# Patient Record
Sex: FEMALE | Race: OTHER | Employment: OTHER | URBAN - METROPOLITAN AREA
[De-identification: names, ages, dates, MRNs, and addresses within clinical notes are randomized per-mention and may not be internally consistent; named-entity substitution may affect disease eponyms.]

---

## 2017-06-13 NOTE — PATIENT DISCUSSION
AMD (DRY), Ou__:  PRESCRIBE AREDS 2 VITAMINS / AMSLER GRID QD/ UV PROTECTION. SMOKING CESSATION EMPHASIZED. RETURN FOR FOLLOW-UP AS SCHEDULED.

## 2018-06-13 NOTE — PATIENT DISCUSSION
Macular Drusen Counseling:   I have explained the diagnosis of macular drusen. The patient was advised of the importance of annual dilated eye exams. Return for follow-up as scheduled.

## 2021-02-11 NOTE — PATIENT DISCUSSION
AMD (Dry) Counseling:  I have instructed the patient to take an AREDS 2 vitamin mixture to minimize the risk of disease progression. The importance of daily monitoring with Amsler grid was emphasized and an Amsler grid was provided if the patient did not have one. The patient was advised to call the office if new symptoms of persistent blurring or distortion of vision arise as evaluation and possible treatment is necessary to preserve as much vision as possible. Return for follow-up as scheduled. yes

## 2021-03-17 NOTE — PATIENT DISCUSSION
AMD (DRY), OU: PRESCRIBE AREDS 2 VITAMINS / AMSLER GRID QD/ UV PROTECTION. PATIENT EDUCATION GIVEN. SMOKING CESSATION EMPHASIZED. RETURN FOR 6 MONTH FOLLOW-UP, AS SCHEDULED, FOR FUTHER OCT TESTING EVALUATION AND MANAGEMENT.

## 2021-03-17 NOTE — PATIENT DISCUSSION
New Prescription: TobraDex (tobramycin-dexamethasone): ointment: 0.3-0.1% a small amount twice a day on eyelid 03-

## 2021-03-17 NOTE — PATIENT DISCUSSION
EYELID LESION, OD LOWER:  PATIENT EDUCATION GIVEN. TOBRADEX OINTMENT BID, WARM COMPRESSES. CONTINUE TO MONITOR. RETURN FOR FOLLOW UP, 2 WEEKS FOR RECHECK.

## 2022-03-29 ENCOUNTER — NEW PATIENT (OUTPATIENT)
Dept: URBAN - METROPOLITAN AREA CLINIC 45 | Facility: CLINIC | Age: 87
End: 2022-03-29

## 2022-03-29 DIAGNOSIS — H52.7: ICD-10-CM

## 2022-03-29 DIAGNOSIS — H04.123: ICD-10-CM

## 2022-03-29 DIAGNOSIS — H40.1130: ICD-10-CM

## 2022-03-29 PROCEDURE — 92015 DETERMINE REFRACTIVE STATE: CPT

## 2022-03-29 PROCEDURE — 92002 INTRM OPH EXAM NEW PATIENT: CPT

## 2022-03-29 ASSESSMENT — TONOMETRY
OD_IOP_MMHG: 14
OS_IOP_MMHG: 15

## 2022-03-29 ASSESSMENT — VISUAL ACUITY
OS_CC: 20/40+1
OU_CC: J3
OU_CC: 20/40+1
OD_CC: 20/60

## 2022-03-29 NOTE — PATIENT DISCUSSION
Glasses Prescription given to patient. No significant difference today. Current spectacles ok to use.

## 2022-03-29 NOTE — PATIENT DISCUSSION
Refer to Dr. Nixon Rainey for [facility] management in accordance with referral.

## 2022-05-05 ENCOUNTER — CONSULTATION/EVALUATION (OUTPATIENT)
Dept: URBAN - METROPOLITAN AREA CLINIC 43 | Facility: CLINIC | Age: 87
End: 2022-05-05

## 2022-05-05 DIAGNOSIS — H40.1134: ICD-10-CM

## 2022-05-05 PROCEDURE — 76514 ECHO EXAM OF EYE THICKNESS: CPT

## 2022-05-05 PROCEDURE — 92250 FUNDUS PHOTOGRAPHY W/I&R: CPT

## 2022-05-05 PROCEDURE — 92020 GONIOSCOPY: CPT

## 2022-05-05 PROCEDURE — 92004 COMPRE OPH EXAM NEW PT 1/>: CPT

## 2022-05-05 ASSESSMENT — VISUAL ACUITY
OD_CC: J10
OD_CC: 20/60-2
OS_CC: 20/50-2
OS_CC: J10

## 2022-05-05 ASSESSMENT — TONOMETRY
OD_IOP_MMHG: 12
OS_IOP_MMHG: 10

## 2022-05-05 ASSESSMENT — PACHYMETRY
OS_CT_UM: 557
OD_CT_UM: 564

## 2022-05-05 NOTE — PATIENT DISCUSSION
IOP IN TARGET RANGE.  LIKELY STABLE ON TIMOLOL BID OU.  CONTINUE CURRENT MANAGEMENT.  PT TO SEE DR PORTER EVERY 6 MONTHS FOR MONITORING.  IF CHANGES, SEND BACK TO [facility].

## 2022-11-03 ENCOUNTER — FOLLOW UP (OUTPATIENT)
Dept: URBAN - METROPOLITAN AREA CLINIC 45 | Facility: CLINIC | Age: 87
End: 2022-11-03

## 2022-12-14 ENCOUNTER — FOLLOW UP (OUTPATIENT)
Dept: URBAN - METROPOLITAN AREA CLINIC 45 | Facility: CLINIC | Age: 87
End: 2022-12-14

## 2022-12-14 NOTE — PATIENT DISCUSSION
[address] [facility] 5/5/22: IOP IN TARGET RANGE.  LIKELY STABLE ON TIMOLOL BID OU.  CONTINUE CURRENT MANAGEMENT.  PT TO SEE DR PORTER EVERY 6 MONTHS FOR MONITORING.  IF CHANGES, SEND BACK TO [facility].

## 2023-04-17 ENCOUNTER — FOLLOW UP (OUTPATIENT)
Dept: URBAN - METROPOLITAN AREA CLINIC 47 | Facility: CLINIC | Age: 88
End: 2023-04-17

## 2023-10-16 ENCOUNTER — FOLLOW UP (OUTPATIENT)
Dept: URBAN - METROPOLITAN AREA CLINIC 47 | Facility: CLINIC | Age: 88
End: 2023-10-16

## 2023-10-16 DIAGNOSIS — H40.1134: ICD-10-CM

## 2023-10-16 PROCEDURE — 99212 OFFICE O/P EST SF 10 MIN: CPT

## 2023-10-16 ASSESSMENT — TONOMETRY
OS_IOP_MMHG: 11
OD_IOP_MMHG: 10

## 2023-10-16 ASSESSMENT — VISUAL ACUITY
OD_CC: 20/70-2
OS_CC: 20/50
OU_CC: 20/50

## 2024-04-15 ENCOUNTER — FOLLOW UP (OUTPATIENT)
Dept: URBAN - METROPOLITAN AREA CLINIC 43 | Facility: CLINIC | Age: 89
End: 2024-04-15

## 2024-04-15 DIAGNOSIS — H40.1134: ICD-10-CM

## 2024-04-15 PROCEDURE — 99213 OFFICE O/P EST LOW 20 MIN: CPT

## 2024-04-15 ASSESSMENT — TONOMETRY
OD_IOP_MMHG: 13
OS_IOP_MMHG: 14